# Patient Record
Sex: FEMALE | Race: WHITE | NOT HISPANIC OR LATINO | URBAN - METROPOLITAN AREA
[De-identification: names, ages, dates, MRNs, and addresses within clinical notes are randomized per-mention and may not be internally consistent; named-entity substitution may affect disease eponyms.]

---

## 2017-04-28 ENCOUNTER — IMPORTED ENCOUNTER (OUTPATIENT)
Dept: URBAN - METROPOLITAN AREA CLINIC 50 | Facility: CLINIC | Age: 72
End: 2017-04-28

## 2017-05-05 ENCOUNTER — IMPORTED ENCOUNTER (OUTPATIENT)
Dept: URBAN - METROPOLITAN AREA CLINIC 50 | Facility: CLINIC | Age: 72
End: 2017-05-05

## 2018-10-19 ENCOUNTER — IMPORTED ENCOUNTER (OUTPATIENT)
Dept: URBAN - METROPOLITAN AREA CLINIC 50 | Facility: CLINIC | Age: 73
End: 2018-10-19

## 2019-10-25 ENCOUNTER — IMPORTED ENCOUNTER (OUTPATIENT)
Dept: URBAN - METROPOLITAN AREA CLINIC 50 | Facility: CLINIC | Age: 74
End: 2019-10-25

## 2019-10-25 NOTE — PATIENT DISCUSSION
"""Follow ERM w/o surgery. Call if vision decreases or distortion increases.  "" ""OCT Macula: OD: Stable

## 2020-10-29 ENCOUNTER — IMPORTED ENCOUNTER (OUTPATIENT)
Dept: URBAN - METROPOLITAN AREA CLINIC 50 | Facility: CLINIC | Age: 75
End: 2020-10-29

## 2020-10-29 NOTE — PATIENT DISCUSSION
"""Discussed dry eye diagnosis with patient.  Educated patient on proper lid hygiene and stressed importance of lid massages and the use of warm compresses and artificial tears."" ""Continue Artificial tears both eyes TID-QID

## 2020-11-16 ENCOUNTER — IMPORTED ENCOUNTER (OUTPATIENT)
Dept: URBAN - METROPOLITAN AREA CLINIC 50 | Facility: CLINIC | Age: 75
End: 2020-11-16

## 2021-04-18 ASSESSMENT — VISUAL ACUITY
OD_SC: 20/30
OS_CC: J1+
OD_CC: J1+
OD_SC: 20/40
OS_SC: 20/40
OS_SC: 20/30=
OS_SC: 20/30
OS_CC: J1+
OD_PH: 20/30
OD_SC: 20/70
OS_CC: J1+@ 17 IN
OD_PH: 20/40
OS_PH: 20/30-1
OD_PH: 20/30=
OD_PH: @ 17 IN
OD_CC: 20/20-2
OD_CC: J1+
OS_PH: @ 17 IN
OD_CC: J1+@ 17 IN
OS_CC: 20/20
OS_CC: J2
OD_SC: 20/40-
OS_SC: 20/30
OD_CC: J2

## 2021-04-18 ASSESSMENT — TONOMETRY
OS_IOP_MMHG: 16
OS_IOP_MMHG: 16
OS_IOP_MMHG: 17
OD_IOP_MMHG: 16
OD_IOP_MMHG: 16
OS_IOP_MMHG: 15
OD_IOP_MMHG: 16
OD_IOP_MMHG: 15

## 2022-02-07 ENCOUNTER — PREPPED CHART (OUTPATIENT)
Dept: URBAN - METROPOLITAN AREA CLINIC 50 | Facility: CLINIC | Age: 77
End: 2022-02-07

## 2022-02-10 ENCOUNTER — ESTABLISHED PATIENT (OUTPATIENT)
Dept: URBAN - METROPOLITAN AREA CLINIC 50 | Facility: CLINIC | Age: 77
End: 2022-02-10

## 2022-02-10 DIAGNOSIS — H43.811: ICD-10-CM

## 2022-02-10 DIAGNOSIS — H04.123: ICD-10-CM

## 2022-02-10 DIAGNOSIS — H18.593: ICD-10-CM

## 2022-02-10 PROCEDURE — 92014 COMPRE OPH EXAM EST PT 1/>: CPT

## 2022-02-10 PROCEDURE — 92134 CPTRZ OPH DX IMG PST SGM RTA: CPT

## 2022-02-10 ASSESSMENT — TONOMETRY
OD_IOP_MMHG: 16
OS_IOP_MMHG: 17

## 2022-02-10 ASSESSMENT — VISUAL ACUITY
OS_CC: 20/25
OU_CC: 20/25-1
OD_CC: 20/30+1
OU_CC: J1@16IN
OD_PH: 20/25-1

## 2023-10-23 ENCOUNTER — COMPREHENSIVE EXAM (OUTPATIENT)
Dept: URBAN - METROPOLITAN AREA CLINIC 24 | Facility: CLINIC | Age: 78
End: 2023-10-23

## 2023-10-23 DIAGNOSIS — H43.811: ICD-10-CM

## 2023-10-23 PROCEDURE — 92014 COMPRE OPH EXAM EST PT 1/>: CPT

## 2023-10-23 ASSESSMENT — KERATOMETRY
OS_AXISANGLE_DEGREES: 046
OS_K2POWER_DIOPTERS: 46.25
OD_AXISANGLE2_DEGREES: 18
OD_K1POWER_DIOPTERS: 45.75
OS_K1POWER_DIOPTERS: 45.50
OS_AXISANGLE2_DEGREES: 136
OD_AXISANGLE_DEGREES: 108
OD_K2POWER_DIOPTERS: 46.55

## 2023-10-23 ASSESSMENT — VISUAL ACUITY
OS_PH: 20/25
OS_CC: 20/30-1
OU_CC: J1+
OU_CC: 20/30
OD_CC: 20/30

## 2023-10-23 ASSESSMENT — TONOMETRY
OD_IOP_MMHG: 16
OS_IOP_MMHG: 16

## 2025-01-23 ENCOUNTER — COMPREHENSIVE EXAM (OUTPATIENT)
Age: 80
End: 2025-01-23

## 2025-01-23 DIAGNOSIS — H43.811: ICD-10-CM

## 2025-01-23 DIAGNOSIS — H52.4: ICD-10-CM

## 2025-01-23 PROCEDURE — 92015 DETERMINE REFRACTIVE STATE: CPT

## 2025-01-23 PROCEDURE — 99214 OFFICE O/P EST MOD 30 MIN: CPT
